# Patient Record
Sex: MALE | Race: WHITE | URBAN - METROPOLITAN AREA
[De-identification: names, ages, dates, MRNs, and addresses within clinical notes are randomized per-mention and may not be internally consistent; named-entity substitution may affect disease eponyms.]

---

## 2017-02-01 NOTE — PATIENT DISCUSSION
Recommend warm compresses and lid scrubs to help manage blepharitis. Recommend staying on minocycline 50mg 1 PO Q day disp. 90 tabs with 1 refill for 3 months to help manage blepharitis.

## 2017-08-29 NOTE — PATIENT DISCUSSION
New Prescription: neomycin-polymyxin-dexameth (neomycin-polymyxin-dexameth): ointment: 3.5-10,000-0.1 mg-unit/g-% 1 a small amount three times a day as directed into left eye 08-

## 2017-08-29 NOTE — PATIENT DISCUSSION
New Prescription: Keflex (cephalexin): capsule: 500 mg 1 capsule three times a day as directed by mouth 08-

## 2017-09-05 NOTE — PATIENT DISCUSSION
Stopped Today: neomycin-polymyxin-dexameth (neomycin-polymyxin-dexameth): ointment: 3.5-10,000-0.1 mg-unit/g-% 1 a small amount three times a day as directed into left eye 08-

## 2017-09-05 NOTE — PATIENT DISCUSSION
MEIBOMIAN GLAND DYSFUNCTION, OU:  PRESCRIBE WARM COMPRESSES AND EYELID SCRUBS QD-BID, ARTIFICIAL TEARS BID-QID, THE DAILY INTAKE OF OMEGA-3 FATTY ACIDS - ADD AZASITE OU QHS

## 2018-09-05 NOTE — PATIENT DISCUSSION
BLEPHARITIS, OU: PRESCRIBE WARM COMPRESSES AND EYELID SCRUBS QD-BID, ARTIFICIAL TEARS BID-QID, THE DAILY INTAKE OF OMEGA-3 FATTY ACIDS OK TO USE AZASITE QHS PRN,

## 2018-09-05 NOTE — PATIENT DISCUSSION
ECTROPION, OU:  VISUALLY SIGNIFICANT TO THE PATIENT. RECOMMEND ARTIFICIAL TEARS OR OR LUBRICATING OINTMENT PRN.

## 2019-09-10 NOTE — PATIENT DISCUSSION
PTERYGIUM, OS: PROGRESSIVE. PHOTOS TODAY. PRESCRIBE ARTIFICIAL TEARS AS NEEDED AND INCREASE UV PROTECTION.

## 2019-09-10 NOTE — PATIENT DISCUSSION
Pterygium Counseling: I have explained the diagnosis of pterygium and its pathophysiology. Pterygia are relatively common in the general population and typically have little effect on the vision and eye itself. If the lesion increases in size, it may cause visual symptoms due to induced astigmatism or direct encroachment onto the visual axis. I recommended that the patient use artificial tears to relieve any dryness associated with the pterygium and to increase UV protection. If the pterygium becomes inflamed, uncomfortable or is cosmetically unacceptable, then surgery can be performed to remove the growth. Also, if the pterygium has caused astigmatism, the growth may need to be removed prior to cataract testing and surgery. The recurrence rate after surgery has been explained to the patient along with the need for pathological examination of the pterygium. Return for follow-up as scheduled.

## 2020-07-10 NOTE — PATIENT DISCUSSION
New Prescription: Maxitrol (neomycin-polymyxin-dexameth): drops,suspension: 3.5-10,000-0.1 mg/mL-unit/mL-% 1 drop three times a day as directed into right eye 07-

## 2020-09-16 NOTE — PATIENT DISCUSSION
CHALAZION OU: PRESCRIBED WARM COMPRESSES, EYELID SCRUBS AND DOXYCYCLINE 100MG BID PO X 2 WEEKS AND TOBRADEX KANCHAN QHS X 2 WEEKS. CONSIDER LONG TERM DOXY. KENALOG INJECTION TODAY.

## 2020-09-16 NOTE — PATIENT DISCUSSION
TRICHIASIS, LLL: LASHES EPILATED WITHOUT DIFFICULTY FROM LEFT LOWER. IF RECURRENT WILL CONSIDER REFERRAL FOR SURGICAL TREATMENT.

## 2020-09-16 NOTE — PATIENT DISCUSSION
New Prescription: Maxitrol (neomycin-polymyxin-dexameth): ointment: 3.5-10,000-0.1 mg-unit/g-% a small amount at bedtime as directed into both eyes 09-

## 2020-11-11 NOTE — PATIENT DISCUSSION
BLEPHARITIS, OU: PRESCRIBE WARM COMPRESSES AND EYELID SCRUBS QD-BID, ARTIFICIAL TEARS BID-QID, THE DAILY INTAKE OF OMEGA-3 FATTY ACIDS. PRESCRIBE LOTEMAX OINTMENT QHS X 3 WEEKS AND DOXYCYCLINE 100 BID X 2 WEEKS THEN QID X 2 WEEKS.

## 2020-12-16 NOTE — PATIENT DISCUSSION
New Prescription: doxycycline hyclate (doxycycline hyclate): tablet: 50 mg 1 tablet once a day as directed oral 12-

## 2020-12-16 NOTE — PATIENT DISCUSSION
BLEPHARITIS, OU: PRESCRIBE WARM COMPRESSES AND EYELID SCRUBS QD-BID, ARTIFICIAL TEARS BID-QID, THE DAILY INTAKE OF OMEGA-3 FATTY ACIDS. DOXY 50 QDAY.

## 2021-01-18 ENCOUNTER — IMPORTED ENCOUNTER (OUTPATIENT)
Dept: URBAN - METROPOLITAN AREA CLINIC 9 | Facility: CLINIC | Age: 66
End: 2021-01-18

## 2021-02-25 ENCOUNTER — IMPORTED ENCOUNTER (OUTPATIENT)
Dept: URBAN - METROPOLITAN AREA CLINIC 9 | Facility: CLINIC | Age: 66
End: 2021-02-25

## 2021-03-19 ENCOUNTER — IMPORTED ENCOUNTER (OUTPATIENT)
Dept: URBAN - METROPOLITAN AREA CLINIC 9 | Facility: CLINIC | Age: 66
End: 2021-03-19

## 2021-03-24 ENCOUNTER — IMPORTED ENCOUNTER (OUTPATIENT)
Dept: URBAN - METROPOLITAN AREA CLINIC 9 | Facility: CLINIC | Age: 66
End: 2021-03-24

## 2021-03-30 ENCOUNTER — IMPORTED ENCOUNTER (OUTPATIENT)
Dept: URBAN - METROPOLITAN AREA CLINIC 9 | Facility: CLINIC | Age: 66
End: 2021-03-30

## 2021-04-14 ENCOUNTER — IMPORTED ENCOUNTER (OUTPATIENT)
Dept: URBAN - METROPOLITAN AREA CLINIC 9 | Facility: CLINIC | Age: 66
End: 2021-04-14

## 2021-07-14 ENCOUNTER — IMPORTED ENCOUNTER (OUTPATIENT)
Dept: URBAN - METROPOLITAN AREA CLINIC 9 | Facility: CLINIC | Age: 66
End: 2021-07-14

## 2021-09-15 NOTE — PATIENT DISCUSSION
Dry Eye Syndrome Counseling: I have discussed the diagnosis and the pathophysiology of this disease with the patient. Eyelid pathology and systemic illnesses such as Sjogren's disease or rheumatoid arthritis may contribute to severity. Vision may be limited by dry eye, and symptoms exacerbated by environmental factors such as smoke, wind, or prolonged eye use. Lifestyle habits and environmental factors contributing to dry eyes have been reviewed with the patient. Daily prescribed and over the counter medications, along with their potential contributions to dry eye symptoms, have been discussed. Treatment options include, but are not limited to, artificial tears, punctal plugs, topical cyclosporine, oral omega-3 supplements, Lipiflow, moisture goggles, and lubricating ointments. I stressed the importance of compliance with treatment.

## 2021-09-15 NOTE — PATIENT DISCUSSION
Continue: doxycycline hyclate (doxycycline hyclate): tablet: 50 mg 1 tablet once a day as directed oral 12-

## 2021-09-15 NOTE — PATIENT DISCUSSION
BLEPHARITIS, OU: PRESCRIBE WARM COMPRESSES AND EYELID SCRUBS QD-BID, ARTIFICIAL TEARS BID-QID, THE DAILY INTAKE OF

## 2021-10-19 ASSESSMENT — VISUAL ACUITY
OD_PH: 20/40 - SN
OD_CC: 20/25 SN
OS_SC: 20/50 + SN
OS_CC: 20/30 + SN
OD_SC: 20/100 SN
OD_SC: 20/30 SN
OD_SC: 20/25 - SN
OS_SC: 20/40 SN
OS_SC: 20/100 SN
OS_PH: 20/50 SN
OD_CC: 20/25 - SN
OD_SC: 20/40 -2 SN
OD_CC: 20/25 - SN
OS_SC: 20/40 -2 SN
OS_CC: 20/25 SN
OD_SC: 20/100 SN
OD_SC: 20/60 SN
OS_SC: 20/40 SN
OS_PH: 20/25 - SN
OS_CC: 20/20 - SN
OD_CC: 20/25 SN
OS_SC: 20/40 SN
OS_CC: 20/20 - SN
OD_CC: 20/25 -2 SN

## 2021-10-19 ASSESSMENT — TONOMETRY
OS_IOP_MMHG: 11
OD_IOP_MMHG: 12
OS_IOP_MMHG: 20
OS_IOP_MMHG: 12
OD_IOP_MMHG: 18
OD_IOP_MMHG: 23
OD_IOP_MMHG: 12
OS_IOP_MMHG: 14
OD_IOP_MMHG: 19
OS_IOP_MMHG: 16

## 2021-10-19 ASSESSMENT — KERATOMETRY
OD_AXISANGLE2_DEGREES: 68
OS_AXISANGLE_DEGREES: 28
OS_K2POWER_DIOPTERS: 46.75
OS_K1POWER_DIOPTERS: 46.5
OD_AXISANGLE_DEGREES: 158
OD_K2POWER_DIOPTERS: 46.25
OS_AXISANGLE2_DEGREES: 118
OD_K1POWER_DIOPTERS: 45.75

## 2022-07-21 ENCOUNTER — ESTABLISHED PATIENT (OUTPATIENT)
Dept: URBAN - METROPOLITAN AREA CLINIC 4 | Facility: CLINIC | Age: 67
End: 2022-07-21

## 2022-07-21 DIAGNOSIS — E11.9: ICD-10-CM

## 2022-07-21 DIAGNOSIS — H04.123: ICD-10-CM

## 2022-07-21 PROCEDURE — 99214 OFFICE O/P EST MOD 30 MIN: CPT

## 2022-07-21 PROCEDURE — 92250 FUNDUS PHOTOGRAPHY W/I&R: CPT

## 2022-07-21 ASSESSMENT — TONOMETRY
OS_IOP_MMHG: 14
OD_IOP_MMHG: 12

## 2022-07-21 ASSESSMENT — VISUAL ACUITY
OS_SC: 20/30
OD_SC: 20/25

## 2023-07-24 ENCOUNTER — ESTABLISHED PATIENT (OUTPATIENT)
Dept: URBAN - METROPOLITAN AREA CLINIC 4 | Facility: CLINIC | Age: 68
End: 2023-07-24

## 2023-07-24 DIAGNOSIS — E11.9: ICD-10-CM

## 2023-07-24 DIAGNOSIS — H04.123: ICD-10-CM

## 2023-07-24 PROCEDURE — 92250 FUNDUS PHOTOGRAPHY W/I&R: CPT

## 2023-07-24 PROCEDURE — 99214 OFFICE O/P EST MOD 30 MIN: CPT

## 2023-07-24 ASSESSMENT — VISUAL ACUITY
OD_GLARE: 20/50
OS_GLARE: 20/70
OS_SC: J3
OD_SC: 20/30-1
OU_SC: 20/30
OU_SC: J3
OS_SC: 20/30
OD_SC: J7

## 2023-07-24 ASSESSMENT — KERATOMETRY
OD_AXISANGLE2_DEGREES: 17
OD_AXISANGLE_DEGREES: 107
OD_K2POWER_DIOPTERS: 48.00
OS_AXISANGLE2_DEGREES: 27
OS_K1POWER_DIOPTERS: 46.25
OS_K2POWER_DIOPTERS: 47.00
OD_K1POWER_DIOPTERS: 46.75
OS_AXISANGLE_DEGREES: 117

## 2023-07-24 ASSESSMENT — TONOMETRY
OD_IOP_MMHG: 13
OS_IOP_MMHG: 14

## 2024-07-05 ENCOUNTER — ESTABLISHED PATIENT (OUTPATIENT)
Dept: URBAN - METROPOLITAN AREA CLINIC 14 | Facility: CLINIC | Age: 69
End: 2024-07-05

## 2024-07-05 ASSESSMENT — VISUAL ACUITY
OS_PH: 20/30
OD_BCVA: 20/25
OD_SC: 20/40
OS_SC: 20/60
OS_BCVA: 20/50
OD_CC: J5
OS_CC: J7
OD_PH: 20/25

## 2024-07-05 ASSESSMENT — KERATOMETRY
OD_AXISANGLE2_DEGREES: 90
OS_AXISANGLE2_DEGREES: 6
OD_K1POWER_DIOPTERS: 46
OS_K1POWER_DIOPTERS: 46.00
OD_AXISANGLE_DEGREES: 0
OD_K2POWER_DIOPTERS: 46.00
OS_AXISANGLE_DEGREES: 96
OS_K2POWER_DIOPTERS: 46.25

## 2024-07-05 ASSESSMENT — TONOMETRY
OD_IOP_MMHG: 19
OS_IOP_MMHG: 21

## 2024-07-08 ENCOUNTER — ESTABLISHED PATIENT (OUTPATIENT)
Dept: URBAN - METROPOLITAN AREA CLINIC 14 | Facility: CLINIC | Age: 69
End: 2024-07-08

## 2024-07-08 DIAGNOSIS — H26.493: ICD-10-CM

## 2024-07-08 PROCEDURE — 99214 OFFICE O/P EST MOD 30 MIN: CPT

## 2024-07-08 ASSESSMENT — VISUAL ACUITY
OD_CC: J5
OD_BCVA: 20/25
OS_CC: J7
OD_SC: 20/40
OS_BCVA: 20/50
OS_SC: 20/60

## 2024-07-08 ASSESSMENT — KERATOMETRY
OD_AXISANGLE2_DEGREES: 90
OD_K2POWER_DIOPTERS: 46.00
OS_AXISANGLE_DEGREES: 96
OS_AXISANGLE2_DEGREES: 6
OD_K1POWER_DIOPTERS: 46
OS_K1POWER_DIOPTERS: 46.00
OS_K2POWER_DIOPTERS: 46.25
OD_AXISANGLE_DEGREES: 0

## 2024-07-08 ASSESSMENT — TONOMETRY
OD_IOP_MMHG: 19
OS_IOP_MMHG: 21

## 2024-07-10 ENCOUNTER — SURGERY/PROCEDURE (OUTPATIENT)
Dept: URBAN - METROPOLITAN AREA SURGERY 6 | Facility: SURGERY | Age: 69
End: 2024-07-10

## 2024-07-10 DIAGNOSIS — H26.493: ICD-10-CM

## 2024-07-10 PROCEDURE — 66821 AFTER CATARACT LASER SURGERY: CPT

## 2024-07-12 ASSESSMENT — KERATOMETRY
OS_AXISANGLE2_DEGREES: 6
OD_K2POWER_DIOPTERS: 46.00
OS_AXISANGLE_DEGREES: 96
OS_K1POWER_DIOPTERS: 46.00
OD_AXISANGLE_DEGREES: 0
OS_K2POWER_DIOPTERS: 46.25
OD_K1POWER_DIOPTERS: 46
OD_AXISANGLE2_DEGREES: 90

## 2024-07-17 ENCOUNTER — SURGERY/PROCEDURE (OUTPATIENT)
Dept: URBAN - METROPOLITAN AREA SURGERY 6 | Facility: SURGERY | Age: 69
End: 2024-07-17

## 2024-07-17 DIAGNOSIS — H26.493: ICD-10-CM

## 2024-07-17 PROCEDURE — 66821 AFTER CATARACT LASER SURGERY: CPT | Mod: 79,RT

## 2024-07-18 ASSESSMENT — KERATOMETRY
OS_AXISANGLE2_DEGREES: 6
OD_AXISANGLE2_DEGREES: 90
OD_K2POWER_DIOPTERS: 46.00
OS_AXISANGLE_DEGREES: 96
OD_K1POWER_DIOPTERS: 46
OD_AXISANGLE_DEGREES: 0
OS_K2POWER_DIOPTERS: 46.25
OS_K1POWER_DIOPTERS: 46.00

## 2024-08-27 ENCOUNTER — POST-OP (OUTPATIENT)
Dept: URBAN - METROPOLITAN AREA CLINIC 14 | Facility: CLINIC | Age: 69
End: 2024-08-27

## 2024-08-27 DIAGNOSIS — Z98.890: ICD-10-CM

## 2024-08-27 PROCEDURE — 99024 POSTOP FOLLOW-UP VISIT: CPT

## 2024-08-27 ASSESSMENT — KERATOMETRY
OS_K2POWER_DIOPTERS: 46.75
OS_AXISANGLE_DEGREES: 103
OS_AXISANGLE2_DEGREES: 13
OD_AXISANGLE_DEGREES: 180
OD_K2POWER_DIOPTERS: 46.25
OD_AXISANGLE2_DEGREES: 90
OD_K1POWER_DIOPTERS: 46.25
OS_K1POWER_DIOPTERS: 46.25

## 2024-08-27 ASSESSMENT — VISUAL ACUITY
OD_SC: 20/20-2
OS_SC: 20/32
OD_SC: 20/80
OS_SC: 20/40-2
OU_SC: 20/20-2
OU_SC: 20/32

## 2024-08-27 ASSESSMENT — TONOMETRY
OD_IOP_MMHG: 17
OS_IOP_MMHG: 19